# Patient Record
Sex: FEMALE | Race: BLACK OR AFRICAN AMERICAN | NOT HISPANIC OR LATINO | URBAN - METROPOLITAN AREA
[De-identification: names, ages, dates, MRNs, and addresses within clinical notes are randomized per-mention and may not be internally consistent; named-entity substitution may affect disease eponyms.]

---

## 2017-04-24 RX ORDER — HALOPERIDOL DECANOATE 100 MG/ML
0 INJECTION INTRAMUSCULAR
Qty: 1 | Refills: 0 | COMMUNITY
Start: 2017-04-24

## 2017-05-04 RX ORDER — TRAZODONE HCL 50 MG
0 TABLET ORAL
Qty: 30 | Refills: 0 | COMMUNITY
Start: 2017-05-04

## 2017-05-04 RX ORDER — GABAPENTIN 400 MG/1
0 CAPSULE ORAL
Qty: 90 | Refills: 0 | COMMUNITY
Start: 2017-05-04

## 2017-05-08 ENCOUNTER — INPATIENT (INPATIENT)
Facility: HOSPITAL | Age: 42
LOS: 2 days | Discharge: HOME | DRG: 92 | End: 2017-05-11
Attending: INTERNAL MEDICINE | Admitting: INTERNAL MEDICINE
Payer: MEDICARE

## 2017-05-08 VITALS
DIASTOLIC BLOOD PRESSURE: 70 MMHG | WEIGHT: 160.06 LBS | OXYGEN SATURATION: 96 % | TEMPERATURE: 98 F | RESPIRATION RATE: 17 BRPM | SYSTOLIC BLOOD PRESSURE: 120 MMHG | HEART RATE: 74 BPM | HEIGHT: 67 IN

## 2017-05-08 DIAGNOSIS — F06.1 CATATONIC DISORDER DUE TO KNOWN PHYSIOLOGICAL CONDITION: ICD-10-CM

## 2017-05-08 LAB
ALBUMIN SERPL ELPH-MCNC: 3.4 G/DL — LOW (ref 3.5–5)
ALP SERPL-CCNC: 69 U/L — SIGNIFICANT CHANGE UP (ref 40–120)
ALT FLD-CCNC: 11 U/L DA — SIGNIFICANT CHANGE UP (ref 10–60)
ANION GAP SERPL CALC-SCNC: 4 MMOL/L — LOW (ref 5–17)
APAP SERPL-MCNC: <2 UG/ML — LOW (ref 10–30)
AST SERPL-CCNC: 10 U/L — SIGNIFICANT CHANGE UP (ref 10–40)
BASOPHILS # BLD AUTO: 0.1 K/UL — SIGNIFICANT CHANGE UP (ref 0–0.2)
BASOPHILS NFR BLD AUTO: 1.2 % — SIGNIFICANT CHANGE UP (ref 0–2)
BILIRUB SERPL-MCNC: 0.2 MG/DL — SIGNIFICANT CHANGE UP (ref 0.2–1.2)
BUN SERPL-MCNC: 15 MG/DL — SIGNIFICANT CHANGE UP (ref 7–18)
CALCIUM SERPL-MCNC: 8.7 MG/DL — SIGNIFICANT CHANGE UP (ref 8.4–10.5)
CHLORIDE SERPL-SCNC: 107 MMOL/L — SIGNIFICANT CHANGE UP (ref 96–108)
CK SERPL-CCNC: 97 U/L — SIGNIFICANT CHANGE UP (ref 21–215)
CO2 SERPL-SCNC: 29 MMOL/L — SIGNIFICANT CHANGE UP (ref 22–31)
CREAT SERPL-MCNC: 0.88 MG/DL — SIGNIFICANT CHANGE UP (ref 0.5–1.3)
EOSINOPHIL # BLD AUTO: 0.2 K/UL — SIGNIFICANT CHANGE UP (ref 0–0.5)
EOSINOPHIL NFR BLD AUTO: 3.4 % — SIGNIFICANT CHANGE UP (ref 0–6)
ETHANOL SERPL-MCNC: <3 MG/DL — SIGNIFICANT CHANGE UP (ref 0–10)
GLUCOSE SERPL-MCNC: 96 MG/DL — SIGNIFICANT CHANGE UP (ref 70–99)
HCT VFR BLD CALC: 35.6 % — SIGNIFICANT CHANGE UP (ref 34.5–45)
HGB BLD-MCNC: 11.9 G/DL — SIGNIFICANT CHANGE UP (ref 11.5–15.5)
LYMPHOCYTES # BLD AUTO: 1.8 K/UL — SIGNIFICANT CHANGE UP (ref 1–3.3)
LYMPHOCYTES # BLD AUTO: 33.9 % — SIGNIFICANT CHANGE UP (ref 13–44)
MCHC RBC-ENTMCNC: 31.4 PG — SIGNIFICANT CHANGE UP (ref 27–34)
MCHC RBC-ENTMCNC: 33.5 GM/DL — SIGNIFICANT CHANGE UP (ref 32–36)
MCV RBC AUTO: 93.6 FL — SIGNIFICANT CHANGE UP (ref 80–100)
MONOCYTES # BLD AUTO: 0.4 K/UL — SIGNIFICANT CHANGE UP (ref 0–0.9)
MONOCYTES NFR BLD AUTO: 7.2 % — SIGNIFICANT CHANGE UP (ref 2–14)
NEUTROPHILS # BLD AUTO: 3 K/UL — SIGNIFICANT CHANGE UP (ref 1.8–7.4)
NEUTROPHILS NFR BLD AUTO: 54.3 % — SIGNIFICANT CHANGE UP (ref 43–77)
PLATELET # BLD AUTO: 231 K/UL — SIGNIFICANT CHANGE UP (ref 150–400)
POTASSIUM SERPL-MCNC: 3.9 MMOL/L — SIGNIFICANT CHANGE UP (ref 3.5–5.3)
POTASSIUM SERPL-SCNC: 3.9 MMOL/L — SIGNIFICANT CHANGE UP (ref 3.5–5.3)
PROT SERPL-MCNC: 7.1 G/DL — SIGNIFICANT CHANGE UP (ref 6–8.3)
RBC # BLD: 3.8 M/UL — SIGNIFICANT CHANGE UP (ref 3.8–5.2)
RBC # FLD: 11.5 % — SIGNIFICANT CHANGE UP (ref 10.3–14.5)
SALICYLATES SERPL-MCNC: <1.7 MG/DL — LOW (ref 2.8–20)
SODIUM SERPL-SCNC: 140 MMOL/L — SIGNIFICANT CHANGE UP (ref 135–145)
WBC # BLD: 5.4 K/UL — SIGNIFICANT CHANGE UP (ref 3.8–10.5)
WBC # FLD AUTO: 5.4 K/UL — SIGNIFICANT CHANGE UP (ref 3.8–10.5)

## 2017-05-08 PROCEDURE — 99285 EMERGENCY DEPT VISIT HI MDM: CPT

## 2017-05-08 RX ORDER — DIPHENHYDRAMINE HCL 50 MG
25 CAPSULE ORAL EVERY 8 HOURS
Qty: 0 | Refills: 0 | Status: DISCONTINUED | OUTPATIENT
Start: 2017-05-08 | End: 2017-05-09

## 2017-05-08 RX ORDER — SODIUM CHLORIDE 9 MG/ML
1000 INJECTION INTRAMUSCULAR; INTRAVENOUS; SUBCUTANEOUS ONCE
Qty: 0 | Refills: 0 | Status: COMPLETED | OUTPATIENT
Start: 2017-05-08 | End: 2017-05-08

## 2017-05-08 NOTE — ED PROVIDER NOTE - OBJECTIVE STATEMENT
41 y/o F w/ PMHx of Depression presents to the ED c/o body stiffness & generalized weakness x1 month. Pt's mother at bedside. Pt states PMD believes Sx are likely due to rxn to Haldol for Depression. Pt is also on Gabapentin & Clonazepam. Pt states she was last seen by psychiatrist 2 weeks ago. Pt denies fever, chills, or any other complaints. PMD: Dr. Romo. NKDA. 43 y/o F w/ PMHx of Depression presents to the ED c/o body stiffness & generalized weakness x1 month. Pt's mother at bedside. Pt states PMD believes Sx are likely due to rxn to Haldol for Depression. Pt is also on Gabapentin & Clonazepam. Pt states she was last seen by psychiatrist 2 weeks ago but did not bring up sxs. Pt denies fever, chills, or any other complaints. PMD: Dr. Romo. GREY.

## 2017-05-09 DIAGNOSIS — G24.02 DRUG INDUCED ACUTE DYSTONIA: ICD-10-CM

## 2017-05-09 DIAGNOSIS — Z29.9 ENCOUNTER FOR PROPHYLACTIC MEASURES, UNSPECIFIED: ICD-10-CM

## 2017-05-09 LAB
24R-OH-CALCIDIOL SERPL-MCNC: 17.2 NG/ML — LOW (ref 30–100)
AMPHET UR-MCNC: NEGATIVE — SIGNIFICANT CHANGE UP
ANION GAP SERPL CALC-SCNC: 7 MMOL/L — SIGNIFICANT CHANGE UP (ref 5–17)
APPEARANCE UR: CLEAR — SIGNIFICANT CHANGE UP
BARBITURATES UR SCN-MCNC: NEGATIVE — SIGNIFICANT CHANGE UP
BASOPHILS # BLD AUTO: 0.1 K/UL — SIGNIFICANT CHANGE UP (ref 0–0.2)
BASOPHILS NFR BLD AUTO: 1.2 % — SIGNIFICANT CHANGE UP (ref 0–2)
BENZODIAZ UR-MCNC: NEGATIVE — SIGNIFICANT CHANGE UP
BILIRUB UR-MCNC: NEGATIVE — SIGNIFICANT CHANGE UP
BUN SERPL-MCNC: 14 MG/DL — SIGNIFICANT CHANGE UP (ref 7–18)
CALCIUM SERPL-MCNC: 8.6 MG/DL — SIGNIFICANT CHANGE UP (ref 8.4–10.5)
CHLORIDE SERPL-SCNC: 110 MMOL/L — HIGH (ref 96–108)
CO2 SERPL-SCNC: 26 MMOL/L — SIGNIFICANT CHANGE UP (ref 22–31)
COCAINE METAB.OTHER UR-MCNC: NEGATIVE — SIGNIFICANT CHANGE UP
COLOR SPEC: YELLOW — SIGNIFICANT CHANGE UP
CREAT SERPL-MCNC: 0.92 MG/DL — SIGNIFICANT CHANGE UP (ref 0.5–1.3)
DIFF PNL FLD: ABNORMAL
EOSINOPHIL # BLD AUTO: 0.2 K/UL — SIGNIFICANT CHANGE UP (ref 0–0.5)
EOSINOPHIL NFR BLD AUTO: 3.9 % — SIGNIFICANT CHANGE UP (ref 0–6)
GLUCOSE SERPL-MCNC: 85 MG/DL — SIGNIFICANT CHANGE UP (ref 70–99)
GLUCOSE UR QL: NEGATIVE — SIGNIFICANT CHANGE UP
HCT VFR BLD CALC: 34.7 % — SIGNIFICANT CHANGE UP (ref 34.5–45)
HGB BLD-MCNC: 11.5 G/DL — SIGNIFICANT CHANGE UP (ref 11.5–15.5)
KETONES UR-MCNC: NEGATIVE — SIGNIFICANT CHANGE UP
LEUKOCYTE ESTERASE UR-ACNC: ABNORMAL
LYMPHOCYTES # BLD AUTO: 1.7 K/UL — SIGNIFICANT CHANGE UP (ref 1–3.3)
LYMPHOCYTES # BLD AUTO: 33.7 % — SIGNIFICANT CHANGE UP (ref 13–44)
MAGNESIUM SERPL-MCNC: 2.2 MG/DL — SIGNIFICANT CHANGE UP (ref 1.8–2.4)
MCHC RBC-ENTMCNC: 31.4 PG — SIGNIFICANT CHANGE UP (ref 27–34)
MCHC RBC-ENTMCNC: 33.2 GM/DL — SIGNIFICANT CHANGE UP (ref 32–36)
MCV RBC AUTO: 94.7 FL — SIGNIFICANT CHANGE UP (ref 80–100)
METHADONE UR-MCNC: NEGATIVE — SIGNIFICANT CHANGE UP
MONOCYTES # BLD AUTO: 0.5 K/UL — SIGNIFICANT CHANGE UP (ref 0–0.9)
MONOCYTES NFR BLD AUTO: 10.8 % — SIGNIFICANT CHANGE UP (ref 2–14)
NEUTROPHILS # BLD AUTO: 2.5 K/UL — SIGNIFICANT CHANGE UP (ref 1.8–7.4)
NEUTROPHILS NFR BLD AUTO: 50.4 % — SIGNIFICANT CHANGE UP (ref 43–77)
NITRITE UR-MCNC: NEGATIVE — SIGNIFICANT CHANGE UP
OPIATES UR-MCNC: NEGATIVE — SIGNIFICANT CHANGE UP
PCP SPEC-MCNC: SIGNIFICANT CHANGE UP
PCP UR-MCNC: NEGATIVE — SIGNIFICANT CHANGE UP
PH UR: 6 — SIGNIFICANT CHANGE UP (ref 5–8)
PHOSPHATE SERPL-MCNC: 3.3 MG/DL — SIGNIFICANT CHANGE UP (ref 2.5–4.5)
PLATELET # BLD AUTO: 264 K/UL — SIGNIFICANT CHANGE UP (ref 150–400)
POTASSIUM SERPL-MCNC: 4.2 MMOL/L — SIGNIFICANT CHANGE UP (ref 3.5–5.3)
POTASSIUM SERPL-SCNC: 4.2 MMOL/L — SIGNIFICANT CHANGE UP (ref 3.5–5.3)
PROT UR-MCNC: NEGATIVE — SIGNIFICANT CHANGE UP
RBC # BLD: 3.66 M/UL — LOW (ref 3.8–5.2)
RBC # FLD: 11.7 % — SIGNIFICANT CHANGE UP (ref 10.3–14.5)
SODIUM SERPL-SCNC: 143 MMOL/L — SIGNIFICANT CHANGE UP (ref 135–145)
SP GR SPEC: 1.02 — SIGNIFICANT CHANGE UP (ref 1.01–1.02)
THC UR QL: NEGATIVE — SIGNIFICANT CHANGE UP
TSH SERPL-MCNC: 1.57 UU/ML — SIGNIFICANT CHANGE UP (ref 0.34–4.82)
UROBILINOGEN FLD QL: NEGATIVE — SIGNIFICANT CHANGE UP
WBC # BLD: 4.9 K/UL — SIGNIFICANT CHANGE UP (ref 3.8–10.5)
WBC # FLD AUTO: 4.9 K/UL — SIGNIFICANT CHANGE UP (ref 3.8–10.5)

## 2017-05-09 RX ORDER — DOCUSATE SODIUM 100 MG
100 CAPSULE ORAL
Qty: 0 | Refills: 0 | Status: DISCONTINUED | OUTPATIENT
Start: 2017-05-09 | End: 2017-05-11

## 2017-05-09 RX ORDER — DIPHENHYDRAMINE HCL 50 MG
25 CAPSULE ORAL EVERY 8 HOURS
Qty: 0 | Refills: 0 | Status: COMPLETED | OUTPATIENT
Start: 2017-05-09 | End: 2017-05-10

## 2017-05-09 RX ORDER — GABAPENTIN 400 MG/1
100 CAPSULE ORAL THREE TIMES A DAY
Qty: 0 | Refills: 0 | Status: DISCONTINUED | OUTPATIENT
Start: 2017-05-09 | End: 2017-05-11

## 2017-05-09 RX ORDER — SENNA PLUS 8.6 MG/1
2 TABLET ORAL AT BEDTIME
Qty: 0 | Refills: 0 | Status: DISCONTINUED | OUTPATIENT
Start: 2017-05-09 | End: 2017-05-11

## 2017-05-09 RX ORDER — DIPHENHYDRAMINE HCL 50 MG
25 CAPSULE ORAL EVERY 8 HOURS
Qty: 0 | Refills: 0 | Status: DISCONTINUED | OUTPATIENT
Start: 2017-05-09 | End: 2017-05-09

## 2017-05-09 RX ORDER — BENZTROPINE MESYLATE 1 MG
1 TABLET ORAL
Qty: 0 | Refills: 0 | Status: DISCONTINUED | OUTPATIENT
Start: 2017-05-09 | End: 2017-05-11

## 2017-05-09 RX ORDER — TRAZODONE HCL 50 MG
50 TABLET ORAL AT BEDTIME
Qty: 0 | Refills: 0 | Status: DISCONTINUED | OUTPATIENT
Start: 2017-05-09 | End: 2017-05-09

## 2017-05-09 RX ORDER — CLONAZEPAM 1 MG
1 TABLET ORAL
Qty: 0 | Refills: 0 | Status: DISCONTINUED | OUTPATIENT
Start: 2017-05-09 | End: 2017-05-11

## 2017-05-09 RX ORDER — CLONAZEPAM 1 MG
1 TABLET ORAL
Qty: 0 | Refills: 0 | Status: DISCONTINUED | OUTPATIENT
Start: 2017-05-09 | End: 2017-05-09

## 2017-05-09 RX ORDER — TRAZODONE HCL 50 MG
100 TABLET ORAL AT BEDTIME
Qty: 0 | Refills: 0 | Status: DISCONTINUED | OUTPATIENT
Start: 2017-05-09 | End: 2017-05-11

## 2017-05-09 RX ADMIN — Medication 25 MILLIGRAM(S): at 12:51

## 2017-05-09 RX ADMIN — Medication 25 MILLIGRAM(S): at 05:22

## 2017-05-09 RX ADMIN — Medication 100 MILLIGRAM(S): at 21:51

## 2017-05-09 RX ADMIN — Medication 1 MILLIGRAM(S): at 17:46

## 2017-05-09 RX ADMIN — Medication 100 MILLIGRAM(S): at 17:46

## 2017-05-09 RX ADMIN — Medication 1 MILLIGRAM(S): at 05:22

## 2017-05-09 RX ADMIN — Medication 100 MILLIGRAM(S): at 05:22

## 2017-05-09 RX ADMIN — SODIUM CHLORIDE 2000 MILLILITER(S): 9 INJECTION INTRAMUSCULAR; INTRAVENOUS; SUBCUTANEOUS at 00:23

## 2017-05-09 RX ADMIN — GABAPENTIN 100 MILLIGRAM(S): 400 CAPSULE ORAL at 21:51

## 2017-05-09 RX ADMIN — SENNA PLUS 2 TABLET(S): 8.6 TABLET ORAL at 21:51

## 2017-05-09 RX ADMIN — GABAPENTIN 100 MILLIGRAM(S): 400 CAPSULE ORAL at 05:22

## 2017-05-09 RX ADMIN — Medication 25 MILLIGRAM(S): at 21:51

## 2017-05-09 RX ADMIN — Medication 25 MILLIGRAM(S): at 00:27

## 2017-05-09 RX ADMIN — GABAPENTIN 100 MILLIGRAM(S): 400 CAPSULE ORAL at 12:51

## 2017-05-09 NOTE — H&P ADULT. - ASSESSMENT
41 yo F with hx of psychotic/mood disorder on haldol and recently discontinued benztropine, p/w muscle stiffness and weakness, admitted for drug-induced dystonia

## 2017-05-09 NOTE — H&P ADULT. - MUSCULOSKELETAL
details… detailed exam no joint warmth/no joint erythema/no joint swelling/no calf tenderness/decreased ROM due to pain

## 2017-05-09 NOTE — H&P ADULT. - HISTORY OF PRESENT ILLNESS
41 yo F from home with PMH of psychosis, ?bipolar, p/w c/o several weeks of body stiffness and weakness. Pt was sent by PCP for further evaluation. Pt denies fever, chills, n/v, arthralgia, myalgia, trauma, visual disturbances, headache, incontinence, SOB. ROS + for constipation. In ED, pt is in NAD with stable vitals, but with soft and slow speech, bradykinesia, and flat affect. Of note, pt tells that 1 month ago per her PCP discontinued benztropine. Pt received monthly injections of Haldol.

## 2017-05-09 NOTE — PHYSICAL THERAPY INITIAL EVALUATION ADULT - CRITERIA FOR SKILLED THERAPEUTIC INTERVENTIONS
impairments found/therapy frequency/anticipated discharge recommendation/predicted duration of therapy intervention

## 2017-05-09 NOTE — H&P ADULT. - PROBLEM SELECTOR PLAN 1
likely 2/2 haldol use and discontinuation of benztropine  will c/w supportive care, benadryl for now. If refractory to benadryl consider adding ativan or trihexyphenidyl   psych consulted

## 2017-05-09 NOTE — PATIENT PROFILE ADULT. - VISION (WITH CORRECTIVE LENSES IF THE PATIENT USUALLY WEARS THEM):
glasses for vision/Partially impaired: cannot see medication labels or newsprint, but can see obstacles in path, and the surrounding layout; can count fingers at arm's length

## 2017-05-10 ENCOUNTER — TRANSCRIPTION ENCOUNTER (OUTPATIENT)
Age: 42
End: 2017-05-10

## 2017-05-10 DIAGNOSIS — F06.1 CATATONIC DISORDER DUE TO KNOWN PHYSIOLOGICAL CONDITION: ICD-10-CM

## 2017-05-10 LAB
ANION GAP SERPL CALC-SCNC: 6 MMOL/L — SIGNIFICANT CHANGE UP (ref 5–17)
BASOPHILS # BLD AUTO: 0.1 K/UL — SIGNIFICANT CHANGE UP (ref 0–0.2)
BASOPHILS NFR BLD AUTO: 1.7 % — SIGNIFICANT CHANGE UP (ref 0–2)
BUN SERPL-MCNC: 18 MG/DL — SIGNIFICANT CHANGE UP (ref 7–18)
CALCIUM SERPL-MCNC: 8.8 MG/DL — SIGNIFICANT CHANGE UP (ref 8.4–10.5)
CHLORIDE SERPL-SCNC: 109 MMOL/L — HIGH (ref 96–108)
CO2 SERPL-SCNC: 26 MMOL/L — SIGNIFICANT CHANGE UP (ref 22–31)
CREAT SERPL-MCNC: 0.94 MG/DL — SIGNIFICANT CHANGE UP (ref 0.5–1.3)
EOSINOPHIL # BLD AUTO: 0.2 K/UL — SIGNIFICANT CHANGE UP (ref 0–0.5)
EOSINOPHIL NFR BLD AUTO: 4.9 % — SIGNIFICANT CHANGE UP (ref 0–6)
GLUCOSE SERPL-MCNC: 85 MG/DL — SIGNIFICANT CHANGE UP (ref 70–99)
HCT VFR BLD CALC: 35.6 % — SIGNIFICANT CHANGE UP (ref 34.5–45)
HGB BLD-MCNC: 11.6 G/DL — SIGNIFICANT CHANGE UP (ref 11.5–15.5)
LYMPHOCYTES # BLD AUTO: 1.7 K/UL — SIGNIFICANT CHANGE UP (ref 1–3.3)
LYMPHOCYTES # BLD AUTO: 36.4 % — SIGNIFICANT CHANGE UP (ref 13–44)
MCHC RBC-ENTMCNC: 31.2 PG — SIGNIFICANT CHANGE UP (ref 27–34)
MCHC RBC-ENTMCNC: 32.8 GM/DL — SIGNIFICANT CHANGE UP (ref 32–36)
MCV RBC AUTO: 95.2 FL — SIGNIFICANT CHANGE UP (ref 80–100)
MONOCYTES # BLD AUTO: 0.5 K/UL — SIGNIFICANT CHANGE UP (ref 0–0.9)
MONOCYTES NFR BLD AUTO: 11.5 % — SIGNIFICANT CHANGE UP (ref 2–14)
NEUTROPHILS # BLD AUTO: 2.2 K/UL — SIGNIFICANT CHANGE UP (ref 1.8–7.4)
NEUTROPHILS NFR BLD AUTO: 45.5 % — SIGNIFICANT CHANGE UP (ref 43–77)
PLATELET # BLD AUTO: 267 K/UL — SIGNIFICANT CHANGE UP (ref 150–400)
POTASSIUM SERPL-MCNC: 4.2 MMOL/L — SIGNIFICANT CHANGE UP (ref 3.5–5.3)
POTASSIUM SERPL-SCNC: 4.2 MMOL/L — SIGNIFICANT CHANGE UP (ref 3.5–5.3)
RBC # BLD: 3.73 M/UL — LOW (ref 3.8–5.2)
RBC # FLD: 11.8 % — SIGNIFICANT CHANGE UP (ref 10.3–14.5)
SODIUM SERPL-SCNC: 141 MMOL/L — SIGNIFICANT CHANGE UP (ref 135–145)
WBC # BLD: 4.8 K/UL — SIGNIFICANT CHANGE UP (ref 3.8–10.5)
WBC # FLD AUTO: 4.8 K/UL — SIGNIFICANT CHANGE UP (ref 3.8–10.5)

## 2017-05-10 RX ORDER — CLONAZEPAM 1 MG
0.5 TABLET ORAL
Qty: 0 | Refills: 0 | COMMUNITY
Start: 2017-05-10

## 2017-05-10 RX ORDER — GABAPENTIN 400 MG/1
1 CAPSULE ORAL
Qty: 0 | Refills: 0 | COMMUNITY
Start: 2017-05-10

## 2017-05-10 RX ORDER — TRAZODONE HCL 50 MG
1 TABLET ORAL
Qty: 0 | Refills: 0 | COMMUNITY
Start: 2017-05-10

## 2017-05-10 RX ORDER — CLONAZEPAM 1 MG
1 TABLET ORAL
Qty: 0 | Refills: 0 | COMMUNITY
Start: 2017-05-10

## 2017-05-10 RX ORDER — BENZTROPINE MESYLATE 1 MG
1 TABLET ORAL
Qty: 60 | Refills: 0 | OUTPATIENT
Start: 2017-05-10 | End: 2017-06-09

## 2017-05-10 RX ORDER — ENOXAPARIN SODIUM 100 MG/ML
40 INJECTION SUBCUTANEOUS DAILY
Qty: 0 | Refills: 0 | Status: DISCONTINUED | OUTPATIENT
Start: 2017-05-10 | End: 2017-05-11

## 2017-05-10 RX ORDER — CLONAZEPAM 1 MG
1 TABLET ORAL
Qty: 0 | Refills: 0 | COMMUNITY

## 2017-05-10 RX ADMIN — Medication 100 MILLIGRAM(S): at 05:50

## 2017-05-10 RX ADMIN — Medication 1 MILLIGRAM(S): at 18:45

## 2017-05-10 RX ADMIN — GABAPENTIN 100 MILLIGRAM(S): 400 CAPSULE ORAL at 15:11

## 2017-05-10 RX ADMIN — GABAPENTIN 100 MILLIGRAM(S): 400 CAPSULE ORAL at 05:50

## 2017-05-10 RX ADMIN — Medication 1 MILLIGRAM(S): at 05:50

## 2017-05-10 RX ADMIN — Medication 1 MILLIGRAM(S): at 05:51

## 2017-05-10 RX ADMIN — GABAPENTIN 100 MILLIGRAM(S): 400 CAPSULE ORAL at 21:33

## 2017-05-10 RX ADMIN — SENNA PLUS 2 TABLET(S): 8.6 TABLET ORAL at 21:33

## 2017-05-10 RX ADMIN — Medication 100 MILLIGRAM(S): at 18:45

## 2017-05-10 RX ADMIN — Medication 100 MILLIGRAM(S): at 21:33

## 2017-05-10 RX ADMIN — ENOXAPARIN SODIUM 40 MILLIGRAM(S): 100 INJECTION SUBCUTANEOUS at 11:43

## 2017-05-10 RX ADMIN — Medication 25 MILLIGRAM(S): at 05:50

## 2017-05-10 NOTE — PROGRESS NOTE ADULT - PROBLEM SELECTOR PLAN 1
Plan: likely 2/2 haldol use and discontinuation of benztropine  -patient has been receiving Haldol I/M once a month by outpatient psychiatrist  -patient still c/o weakness, per psych c/w current meds. Needs Pt re-eval  -c/w klonopin, trazodone, gabapentin

## 2017-05-10 NOTE — DISCHARGE NOTE ADULT - MEDICATION SUMMARY - MEDICATIONS TO STOP TAKING
I will STOP taking the medications listed below when I get home from the hospital:    GABAPENTIN 100 MG CAPSULE    HALOPERIDOL  MG/ML VIAL

## 2017-05-10 NOTE — PROGRESS NOTE ADULT - SUBJECTIVE AND OBJECTIVE BOX
Patient is a 42y old  Female who presents with a chief complaint of stiffness, weakness (09 May 2017 00:02) pt had diffuse stiffness unable to wear her jacket and muscle discomfort       INTERVAL HPI/OVERNIGHT EVENTS:  T(C): 36.3, Max: 37.2 ( @ 15:29)  HR: 60 (60 - 82)  BP: 96/59 (95/62 - 110/68)  RR: 16 (15 - 16)  SpO2: 98% (95% - 99%)  Wt(kg): --  I&O's Summary      LABS:                        11.6   4.8   )-----------( 267      ( 10 May 2017 07:32 )             35.6     05-10    141  |  109<H>  |  18  ----------------------------<  85  4.2   |  26  |  0.94    Ca    8.8      10 May 2017 07:32  Phos  3.3     -  Mg     2.2         TPro  7.1  /  Alb  3.4<L>  /  TBili  0.2  /  DBili  x   /  AST  10  /  ALT  11  /  AlkPhos  69  05-08      Urinalysis Basic - ( 08 May 2017 23:51 )    Color: Yellow / Appearance: Clear / S.020 / pH: x  Gluc: x / Ketone: Negative  / Bili: Negative / Urobili: Negative   Blood: x / Protein: Negative / Nitrite: Negative   Leuk Esterase: Trace / RBC: 0-2 /HPF / WBC 3-5 /HPF   Sq Epi: x / Non Sq Epi: Moderate / Bacteria: Moderate /HPF      REVIEW OF SYSTEMS:  CONSTITUTIONAL: generalized weakness  EYES: No eye pain, visual disturbances, or discharge  ENMT:  No difficulty hearing, tinnitus, vertigo; No sinus or throat pain  NECK: No pain or stiffness  BREASTS: No pain, masses, or nipple discharge  RESPIRATORY: No cough, wheezing, chills or hemoptysis; No shortness of breath  CARDIOVASCULAR: No chest pain, palpitations, dizziness, or leg swelling  GASTROINTESTINAL: No abdominal or epigastric pain. No nausea, vomiting, or hematemesis; No diarrhea or constipation. No melena or hematochezia.  GENITOURINARY: No dysuria, frequency, hematuria, or incontinence  NEUROLOGICAL: No headaches, memory loss, loss of strength, numbness, or tremors  SKIN: No itching, burning, rashes, or lesions   LYMPH NODES: No enlarged glands  ENDOCRINE: No heat or cold intolerance; No hair loss  MUSCULOSKELETAL: No joint pain or swelling; No muscle, back, or extremity pain  PSYCHIATRIC: No depression, anxiety, mood swings, or difficulty sleeping  HEME/LYMPH: No easy bruising, or bleeding gums  ALLERY AND IMMUNOLOGIC: No hives or eczema    RADIOLOGY & ADDITIONAL TESTS:    Imaging Personally Reviewed:  [ ] YES  [ ] NO    Consultant(s) Notes Reviewed:  [x ] YES  [ ] NO    PHYSICAL EXAM:  GENERAL: NAD, well-groomed, well-developed  HEAD:  Atraumatic, Normocephalic  EYES: EOMI, PERRLA, conjunctiva and sclera clear  ENMT: No tonsillar erythema, exudates, or enlargement; Moist mucous membranes, Good dentition, No lesions  NECK: Supple, No JVD, Normal thyroid  NERVOUS SYSTEM:  Alert & Oriented X3, Good concentration; Motor Strength 5/5 B/L upper and lower pt diffuse stiffness   CHEST/LUNG: Clear to percussion bilaterally; No rales, rhonchi, wheezing, or rubs  HEART: Regular rate and rhythm; No murmurs, rubs, or gallops  ABDOMEN: Soft, Nontender, Nondistended; Bowel sounds present  EXTREMITIES:  2+ Peripheral Pulses, No clubbing, cyanosis, or edema  LYMPH: No lymphadenopathy noted  SKIN: No rashes or lesions    Care Discussed with Consultants/Other Providers [ x] YES  [ ] NO

## 2017-05-10 NOTE — DISCHARGE NOTE ADULT - HOSPITAL COURSE
43 yo F with hx of psychotic/mood disorder on haldol and recently discontinued benztropine, p/w muscle stiffness and weakness, admitted for drug-induced dystonia    #Drug-induced dystonia.  Plan: likely 2/2 haldol use and discontinuation of benztropine  -patient has been receiving Haldol I/M once a month by outpatient psychiatrist  -patient improved, received Benadryl x 24h on admission  -Psych consult Dr Valencia, recommend hold haldol, restart sogentin 1 mg big and increase trzaodone to 100 mg hs  -per outpatient psych patient was receiving haldol 100 mg IM every month last 4/24/17, cogentin had been stopped as it had been given prn and patient said she doesnt need it  -discussed with Dr Valencia, recommends c/w current meds and weakness not related to curent meds  -f/u PT recommended home with no needs, per attending needs re-eval prior to discharge as patient still feeling weak 41 yo F with hx of psychotic/mood disorder on haldol and recently discontinued benztropine, p/w muscle stiffness and weakness, admitted for drug-induced dystonia    #Drug-induced dystonia.  Plan: likely 2/2 haldol use and discontinuation of benztropine  -patient has been receiving Haldol I/M once a month by outpatient psychiatrist  -patient improved, received Benadryl x 24h on admission  -Psych consult Dr Valencia, recommend hold haldol, restart sogentin 1 mg big and increase trzaodone to 100 mg hs  -per outpatient psych patient was receiving haldol 100 mg IM every month last 4/24/17, cogentin had been stopped as it had been given prn and patient said she doesnt need it  -discussed with Dr Valencia, recommends c/w current meds and weakness not related to curent meds  -seen by PT in hospital, no PT needed at home  -Patient received NS bolus for borderline low BP, feels well enough to go home.  Patient medically stable for discharge per attending.

## 2017-05-10 NOTE — DISCHARGE NOTE ADULT - VISION (WITH CORRECTIVE LENSES IF THE PATIENT USUALLY WEARS THEM):
Partially impaired: cannot see medication labels or newsprint, but can see obstacles in path, and the surrounding layout; can count fingers at arm's length/glasses for vision

## 2017-05-10 NOTE — DISCHARGE NOTE ADULT - CARE PROVIDER_API CALL
Justine Thompson), Medicine  [address] [place] [address]  Phone: [phone]  Fax: [phone]    Omkar Valencia), Psychiatry  [address] [place] [address]  Phone: [phone]  Fax: [phone]

## 2017-05-10 NOTE — PROGRESS NOTE ADULT - PROBLEM SELECTOR PLAN 1
MEDICATIONS  (STANDING):  gabapentin 100milliGRAM(s) Oral three times a day  clonazePAM Tablet 1milliGRAM(s) Oral two times a day  benztropine 1milliGRAM(s) Oral two times a day  traZODone 100milliGRAM(s) Oral at bedtime    MEDICATIONS  (PRN):

## 2017-05-10 NOTE — DISCHARGE NOTE ADULT - MEDICATION SUMMARY - MEDICATIONS TO CHANGE
I will SWITCH the dose or number of times a day I take the medications listed below when I get home from the hospital:    TRAZODONE 50 MG TABLET    clonazePAM 1 mg oral tablet  -- 1 tab(s) by mouth 2 times a day

## 2017-05-10 NOTE — DISCHARGE NOTE ADULT - PATIENT PORTAL LINK FT
“You can access the Follow[facility] Patient Portal, offered by [facility], by registering with the following website: [email or web address]”

## 2017-05-10 NOTE — DISCHARGE NOTE ADULT - CARE PLAN
Principal Discharge DX:	Drug-induced dystonia Principal Discharge DX:	Drug-induced dystonia  Goal:	decrease rigidity  Instructions for follow-up, activity and diet:	you were evaluated by psychiatrist Dr Valencia and your medications were adjusted after consulting with outpatient psychiatrist. please f/u PMd and psychiatrist within 1 week

## 2017-05-10 NOTE — PROGRESS NOTE ADULT - SUBJECTIVE AND OBJECTIVE BOX
Patient is a 42y old  Female who presents with a chief complaint of stiffness, weakness (09 May 2017 00:02)      INTERVAL HPI/OVERNIGHT EVENTS: seen by Dr Valencia yesterday, cogentin added, trazodone increased. Patient c/o weakness today.  -per outpatient psych patient was receiving haldol 100 mg IM every month last 17, cogentin had been stopped as it had been given prn and patient said she doesnt need it    MEDICATIONS  (STANDING):  gabapentin 100milliGRAM(s) Oral three times a day  docusate sodium 100milliGRAM(s) Oral two times a day  senna 2Tablet(s) Oral at bedtime  clonazePAM Tablet 1milliGRAM(s) Oral two times a day  benztropine 1milliGRAM(s) Oral two times a day  traZODone 100milliGRAM(s) Oral at bedtime  enoxaparin Injectable 40milliGRAM(s) SubCutaneous daily    MEDICATIONS  (PRN):      Allergies    No Known Allergies    Intolerances        REVIEW OF SYSTEMS:  CONSTITUTIONAL: No fever, weight loss, or fatigue  EYES: No eye pain, visual disturbances, or discharge  ENMT:  No difficulty hearing, tinnitus, vertigo; No sinus or throat pain  NECK: No pain or stiffness  BREASTS: No pain, masses, or nipple discharge  RESPIRATORY: No cough, wheezing, chills or hemoptysis; No shortness of breath  CARDIOVASCULAR: No chest pain, palpitations, dizziness, or leg swelling  GASTROINTESTINAL: No abdominal or epigastric pain. No nausea, vomiting, or hematemesis; No diarrhea or constipation. No melena or hematochezia.  GENITOURINARY: No dysuria, frequency, hematuria, or incontinence  NEUROLOGICAL:weakness  SKIN: No itching, burning, rashes, or lesions   LYMPH NODES: No enlarged glands  ENDOCRINE: No heat or cold intolerance; No hair loss  MUSCULOSKELETAL: No joint pain or swelling; No muscle, back, or extremity pain  PSYCHIATRIC: No depression, anxiety, mood swings, or difficulty sleeping  HEME/LYMPH: No easy bruising, or bleeding gums  ALLERGY AND IMMUNOLOGIC: No hives or eczema    Vital Signs Last 24 Hrs  T(C): 36.3, Max: 37.2 ( @ 15:29)  T(F): 97.4, Max: 98.9 (05 @ 15:29)  HR: 60 (60 - 82)  BP: 96/59 (95/62 - 102/64)  BP(mean): --  RR: 16 (15 - 16)  SpO2: 98% (95% - 98%)    PHYSICAL EXAM:  GENERAL: NAD, well-groomed, well-developed  HEAD:  Atraumatic, Normocephalic  EYES: EOMI, PERRLA, conjunctiva and sclera clear  ENMT: No tonsillar erythema, exudates, or enlargement; Moist mucous membranes, Good dentition, No lesions  NECK: Supple, No JVD, Normal thyroid  NERVOUS SYSTEM:  Alert & Oriented X3, Good concentration; Motor Strength 5/5 B/L upper and  lower extremities; DTRs 2+ intact and symmetric  CHEST/LUNG: Clear to percussion bilaterally; No rales, rhonchi, wheezing, or rubs  HEART: Regular rate and rhythm; No murmurs, rubs, or gallops  ABDOMEN: Soft, Nontender, Nondistended; Bowel sounds present  EXTREMITIES:  2+ Peripheral Pulses, No clubbing, cyanosis, or edema  LYMPH: No lymphadenopathy noted  SKIN: No rashes or lesions    LABS:                        11.6   4.8   )-----------( 267      ( 10 May 2017 07:32 )             35.6     05-10    141  |  109<H>  |  18  ----------------------------<  85  4.2   |  26  |  0.94    Ca    8.8      10 May 2017 07:32  Phos  3.3     05-09  Mg     2.2     05-09    TPro  7.1  /  Alb  3.4<L>  /  TBili  0.2  /  DBili  x   /  AST  10  /  ALT  11  /  AlkPhos  69  05-08      Urinalysis Basic - ( 08 May 2017 23:51 )    Color: Yellow / Appearance: Clear / S.020 / pH: x  Gluc: x / Ketone: Negative  / Bili: Negative / Urobili: Negative   Blood: x / Protein: Negative / Nitrite: Negative   Leuk Esterase: Trace / RBC: 0-2 /HPF / WBC 3-5 /HPF   Sq Epi: x / Non Sq Epi: Moderate / Bacteria: Moderate /HPF      CAPILLARY BLOOD GLUCOSE      RADIOLOGY & ADDITIONAL TESTS:    Imaging Personally Reviewed:  [ x] YES  [ ] NO    Consultant(s) Notes Reviewed:  [x ] YES  [ ] NO    Care Discussed with Consultants/Other Providers [x ] YES  [ ] NO

## 2017-05-10 NOTE — DISCHARGE NOTE ADULT - MEDICATION SUMMARY - MEDICATIONS TO TAKE
I will START or STAY ON the medications listed below when I get home from the hospital:    gabapentin 100 mg oral capsule  -- 1 cap(s) by mouth 3 times a day  -- Indication: For bipolar    clonazePAM 1 mg oral tablet  -- 0.5 tab(s) by mouth 2 times a day  -- Indication: For bipolar    traZODone 100 mg oral tablet  -- 1 tab(s) by mouth once a day (at bedtime)  -- Indication: For bipolar    benztropine 1 mg oral tablet  -- 1 tab(s) by mouth 2 times a day  -- Indication: For bipolar    ergocalciferol 50,000 intl units (1.25 mg) oral capsule  -- 1 cap(s) by mouth once a week  -- Indication: For vitamin d deficiency

## 2017-05-10 NOTE — DISCHARGE NOTE ADULT - PLAN OF CARE
decrease rigidity you were evaluated by psychiatrist Dr Valencia and your medications were adjusted after consulting with outpatient psychiatrist. please f/u PMd and psychiatrist within 1 week

## 2017-05-11 VITALS
RESPIRATION RATE: 18 BRPM | SYSTOLIC BLOOD PRESSURE: 108 MMHG | DIASTOLIC BLOOD PRESSURE: 74 MMHG | OXYGEN SATURATION: 97 % | TEMPERATURE: 98 F | HEART RATE: 88 BPM

## 2017-05-11 PROCEDURE — 85027 COMPLETE CBC AUTOMATED: CPT

## 2017-05-11 PROCEDURE — 97162 PT EVAL MOD COMPLEX 30 MIN: CPT

## 2017-05-11 PROCEDURE — 80307 DRUG TEST PRSMV CHEM ANLYZR: CPT

## 2017-05-11 PROCEDURE — 99285 EMERGENCY DEPT VISIT HI MDM: CPT | Mod: 25

## 2017-05-11 PROCEDURE — 82306 VITAMIN D 25 HYDROXY: CPT

## 2017-05-11 PROCEDURE — 80048 BASIC METABOLIC PNL TOTAL CA: CPT

## 2017-05-11 PROCEDURE — 83735 ASSAY OF MAGNESIUM: CPT

## 2017-05-11 PROCEDURE — 80053 COMPREHEN METABOLIC PANEL: CPT

## 2017-05-11 PROCEDURE — 84100 ASSAY OF PHOSPHORUS: CPT

## 2017-05-11 PROCEDURE — 81001 URINALYSIS AUTO W/SCOPE: CPT

## 2017-05-11 PROCEDURE — 93005 ELECTROCARDIOGRAM TRACING: CPT

## 2017-05-11 PROCEDURE — 82550 ASSAY OF CK (CPK): CPT

## 2017-05-11 PROCEDURE — 84443 ASSAY THYROID STIM HORMONE: CPT

## 2017-05-11 RX ORDER — ERGOCALCIFEROL 1.25 MG/1
50000 CAPSULE ORAL
Qty: 0 | Refills: 0 | Status: DISCONTINUED | OUTPATIENT
Start: 2017-05-11 | End: 2017-05-11

## 2017-05-11 RX ORDER — CLONAZEPAM 1 MG
0.5 TABLET ORAL
Qty: 0 | Refills: 0 | Status: DISCONTINUED | OUTPATIENT
Start: 2017-05-11 | End: 2017-05-11

## 2017-05-11 RX ORDER — SODIUM CHLORIDE 9 MG/ML
1000 INJECTION INTRAMUSCULAR; INTRAVENOUS; SUBCUTANEOUS ONCE
Qty: 0 | Refills: 0 | Status: COMPLETED | OUTPATIENT
Start: 2017-05-11 | End: 2017-05-11

## 2017-05-11 RX ADMIN — SODIUM CHLORIDE 2000 MILLILITER(S): 9 INJECTION INTRAMUSCULAR; INTRAVENOUS; SUBCUTANEOUS at 11:12

## 2017-05-11 RX ADMIN — ENOXAPARIN SODIUM 40 MILLIGRAM(S): 100 INJECTION SUBCUTANEOUS at 12:01

## 2017-05-11 RX ADMIN — GABAPENTIN 100 MILLIGRAM(S): 400 CAPSULE ORAL at 05:35

## 2017-05-11 RX ADMIN — Medication 100 MILLIGRAM(S): at 05:35

## 2017-05-11 RX ADMIN — GABAPENTIN 100 MILLIGRAM(S): 400 CAPSULE ORAL at 13:41

## 2017-05-11 RX ADMIN — SODIUM CHLORIDE 2000 MILLILITER(S): 9 INJECTION INTRAMUSCULAR; INTRAVENOUS; SUBCUTANEOUS at 08:06

## 2017-05-11 RX ADMIN — ERGOCALCIFEROL 50000 UNIT(S): 1.25 CAPSULE ORAL at 12:01

## 2017-05-11 RX ADMIN — Medication 100 MILLIGRAM(S): at 17:15

## 2017-05-11 RX ADMIN — Medication 1 MILLIGRAM(S): at 17:16

## 2017-05-11 RX ADMIN — Medication 1 MILLIGRAM(S): at 05:35

## 2017-05-11 RX ADMIN — Medication 0.5 MILLIGRAM(S): at 17:15

## 2017-05-15 DIAGNOSIS — T44.3X5A ADVERSE EFFECT OF OTHER PARASYMPATHOLYTICS [ANTICHOLINERGICS AND ANTIMUSCARINICS] AND SPASMOLYTICS, INITIAL ENCOUNTER: ICD-10-CM

## 2017-05-15 DIAGNOSIS — F20.0 PARANOID SCHIZOPHRENIA: ICD-10-CM

## 2017-05-15 DIAGNOSIS — F20.2 CATATONIC SCHIZOPHRENIA: ICD-10-CM

## 2017-05-15 DIAGNOSIS — G24.02 DRUG INDUCED ACUTE DYSTONIA: ICD-10-CM

## 2017-05-15 DIAGNOSIS — T43.595A ADVERSE EFFECT OF OTHER ANTIPSYCHOTICS AND NEUROLEPTICS, INITIAL ENCOUNTER: ICD-10-CM

## 2018-06-28 ENCOUNTER — EMERGENCY (EMERGENCY)
Facility: HOSPITAL | Age: 43
LOS: 1 days | Discharge: HOME | End: 2018-06-28
Attending: EMERGENCY MEDICINE
Payer: MEDICARE

## 2018-06-28 VITALS
TEMPERATURE: 99 F | SYSTOLIC BLOOD PRESSURE: 119 MMHG | OXYGEN SATURATION: 100 % | RESPIRATION RATE: 16 BRPM | DIASTOLIC BLOOD PRESSURE: 72 MMHG | HEART RATE: 104 BPM

## 2018-06-28 VITALS
RESPIRATION RATE: 20 BRPM | SYSTOLIC BLOOD PRESSURE: 110 MMHG | DIASTOLIC BLOOD PRESSURE: 74 MMHG | OXYGEN SATURATION: 100 % | HEART RATE: 89 BPM

## 2018-06-28 LAB
APPEARANCE UR: CLEAR — SIGNIFICANT CHANGE UP
BACTERIA # UR AUTO: ABNORMAL /HPF
BILIRUB UR-MCNC: NEGATIVE — SIGNIFICANT CHANGE UP
COLOR SPEC: YELLOW — SIGNIFICANT CHANGE UP
DIFF PNL FLD: ABNORMAL
EPI CELLS # UR: SIGNIFICANT CHANGE UP /HPF
GLUCOSE UR QL: NEGATIVE — SIGNIFICANT CHANGE UP
HCG UR QL: NEGATIVE — SIGNIFICANT CHANGE UP
KETONES UR-MCNC: NEGATIVE — SIGNIFICANT CHANGE UP
LEUKOCYTE ESTERASE UR-ACNC: NEGATIVE — SIGNIFICANT CHANGE UP
NITRITE UR-MCNC: NEGATIVE — SIGNIFICANT CHANGE UP
PH UR: 5 — SIGNIFICANT CHANGE UP (ref 5–8)
PROT UR-MCNC: NEGATIVE — SIGNIFICANT CHANGE UP
RBC CASTS # UR COMP ASSIST: SIGNIFICANT CHANGE UP /HPF (ref 0–2)
SP GR SPEC: 1.01 — SIGNIFICANT CHANGE UP (ref 1.01–1.02)
UROBILINOGEN FLD QL: NEGATIVE — SIGNIFICANT CHANGE UP
WBC UR QL: SIGNIFICANT CHANGE UP /HPF (ref 0–5)

## 2018-06-28 PROCEDURE — 81001 URINALYSIS AUTO W/SCOPE: CPT

## 2018-06-28 PROCEDURE — 81025 URINE PREGNANCY TEST: CPT

## 2018-06-28 PROCEDURE — 72100 X-RAY EXAM L-S SPINE 2/3 VWS: CPT | Mod: 26

## 2018-06-28 PROCEDURE — 99283 EMERGENCY DEPT VISIT LOW MDM: CPT

## 2018-06-28 PROCEDURE — 99283 EMERGENCY DEPT VISIT LOW MDM: CPT | Mod: 25

## 2018-06-28 PROCEDURE — 72100 X-RAY EXAM L-S SPINE 2/3 VWS: CPT

## 2018-06-28 RX ORDER — IBUPROFEN 200 MG
600 TABLET ORAL ONCE
Qty: 0 | Refills: 0 | Status: COMPLETED | OUTPATIENT
Start: 2018-06-28 | End: 2018-06-28

## 2018-06-28 RX ORDER — IBUPROFEN 200 MG
1 TABLET ORAL
Qty: 28 | Refills: 0 | OUTPATIENT
Start: 2018-06-28 | End: 2018-07-04

## 2018-06-28 RX ADMIN — Medication 600 MILLIGRAM(S): at 14:46

## 2018-06-28 NOTE — ED PROVIDER NOTE - MEDICAL DECISION MAKING DETAILS
Pt with lower back pain, likely MSK. Will check UA, urine pregnancy, lumbar X-ray, give ibuprofen and reassess.
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
## 2021-11-12 NOTE — H&P ADULT. - RESPIRATORY
What Type Of Note Output Would You Prefer (Optional)?: Standard Output Hpi Title: Evaluation of Skin Lesions How Severe Are Your Spot(S)?: mild Have Your Spot(S) Been Treated In The Past?: has not been treated detailed exam

## 2022-10-25 NOTE — PHYSICAL THERAPY INITIAL EVALUATION ADULT - LEVEL OF INDEPENDENCE: STAND/SIT, REHAB EVAL
Patient states pain to rectum and vagina, lots of pressure and bleeding into toilet. Patient states history of constipation. Per patient \" I can push it back\" unsure if history prolapse. Patient arrived via wheelchair, trach at [facility] 2 L NC. VSS. Alert .  Exam deferred
independent

## 2023-04-28 NOTE — ED ADULT NURSE NOTE - OBJECTIVE STATEMENT
Pt was send by pmd. Pt states that she is co having generalized pain, at back, and legs that has been ongoing for the past 3 weeks. Pt is having trouble getting dress, stiffness all over her body. alert and awake